# Patient Record
Sex: OTHER/UNKNOWN | URBAN - METROPOLITAN AREA
[De-identification: names, ages, dates, MRNs, and addresses within clinical notes are randomized per-mention and may not be internally consistent; named-entity substitution may affect disease eponyms.]

---

## 2023-05-04 ENCOUNTER — HOSPITAL ENCOUNTER (EMERGENCY)
Age: 88
Discharge: HOME | End: 2023-05-05
Attending: STUDENT IN AN ORGANIZED HEALTH CARE EDUCATION/TRAINING PROGRAM

## 2023-05-04 RX ORDER — 0.9 % SODIUM CHLORIDE 0.9 %
1000 INTRAVENOUS SOLUTION INTRAVENOUS ONCE
Status: DISCONTINUED | OUTPATIENT
Start: 2023-05-04 | End: 2023-05-05 | Stop reason: HOSPADM

## 2023-05-05 NOTE — ED PROVIDER NOTES
[address] [facility],[address]  Emergency Department    DISPOSITION       No diagnosis found. ED Course      Complexity of Problems Addressed:  {Complexity:51128}    Data Reviewed and Analyzed:  Category 1:   {external source:72594}  I ordered each unique test.  I interpreted the results of each unique test.    {Historian (state who, why needed, what they said):44583}    Category 2:   ED EKG was independently interpreted in the absence of a cardiologist.  Rate: ***  EKG Interpretation: {EKG Interpretation:86743:::1}  ST Segments: {ST Segments:27366}    {test reviewed:57928}    Category 3: Discussion of management or test interpretation. See ED Course above    HPI   Cq hBumika Nice is a 80 y.o. adult with a history of chronic left lower extremity injury who presents to the ED via EMS with complaint of unresponsiveness. Patient is unable to provide an accurate history. Per EMS, they were called out to [facility] after patient was noted to be minimally responsive with difficulty breathing. They did not give any medications. When EMS went to investigate his left lower extremity wound they states he became aggressive and started running down the sidewalk. They eventually convinced him to come to the hospital.  Initially reported to EMS that he took [address]. He is not forthcoming with me about any substance abuse tonight. He is awakening to voice and oriented to person and place    History   No past medical history on file. No past surgical history on file. No family history on file. Not on File    Physical Exam   There were no vitals filed for this visit. Nursing note and vitals reviewed. Constitutional: Well developed, sleepy but arouses to voice  HEENT: Atraumatic, conjugate gaze, EOM intact; pupils 3 mm  Neck: Supple  Cardiovascular: No cyanosis, diaphoresis, or JVD appreciated. Normal S1/S2 with no murmurs noted. Tachycardic  Respiratory: Effort normal. No respiratory distress.  Lungs

## 2023-10-14 ENCOUNTER — HOSPITAL ENCOUNTER (EMERGENCY)
Age: 88
Discharge: HOME | End: 2023-10-14
Attending: EMERGENCY MEDICINE

## 2023-10-14 ENCOUNTER — APPOINTMENT (OUTPATIENT)
Dept: CT IMAGING | Age: 88
End: 2023-10-14

## 2023-10-14 VITALS
OXYGEN SATURATION: 93 % | DIASTOLIC BLOOD PRESSURE: 65 MMHG | SYSTOLIC BLOOD PRESSURE: 115 MMHG | TEMPERATURE: 98.9 F | HEART RATE: 86 BPM | RESPIRATION RATE: 16 BRPM

## 2023-10-14 DIAGNOSIS — F10.920 ACUTE ALCOHOLIC INTOXICATION WITHOUT COMPLICATION (HCC): Primary | ICD-10-CM

## 2023-10-14 LAB
ANION GAP SERPL CALC-SCNC: 9 MMOL/L (ref 2–11)
BUN SERPL-MCNC: 3 MG/DL (ref 8–23)
CALCIUM SERPL-MCNC: 8 MG/DL (ref 8.3–10.4)
CHLORIDE SERPL-SCNC: 114 MMOL/L (ref 101–110)
CO2 SERPL-SCNC: 25 MMOL/L (ref 21–32)
CREAT SERPL-MCNC: 0.6 MG/DL (ref 0.8–1.5)
ERYTHROCYTE [DISTWIDTH] IN BLOOD BY AUTOMATED COUNT: 13 % (ref 11.9–14.6)
ETHANOL SERPL-MCNC: 468 MG/DL (ref 0–0.08)
GLUCOSE SERPL-MCNC: 126 MG/DL (ref 65–100)
HCT VFR BLD AUTO: 42.9 % (ref 41.1–50.3)
HGB BLD-MCNC: 14.6 G/DL (ref 13.6–17.2)
MCH RBC QN AUTO: 33.6 PG (ref 26.1–32.9)
MCHC RBC AUTO-ENTMCNC: 34 G/DL (ref 31.4–35)
MCV RBC AUTO: 98.8 FL (ref 82–102)
NRBC # BLD: 0 K/UL (ref 0–0.2)
PLATELET # BLD AUTO: 142 K/UL (ref 150–450)
PMV BLD AUTO: 11.8 FL (ref 9.4–12.3)
POTASSIUM SERPL-SCNC: 3.6 MMOL/L (ref 3.5–5.1)
RBC # BLD AUTO: 4.34 M/UL (ref 4.23–5.6)
SODIUM SERPL-SCNC: 148 MMOL/L (ref 133–143)
WBC # BLD AUTO: 7.8 K/UL (ref 4.3–11.1)

## 2023-10-14 PROCEDURE — 80048 BASIC METABOLIC PNL TOTAL CA: CPT

## 2023-10-14 PROCEDURE — 6360000002 HC RX W HCPCS: Performed by: EMERGENCY MEDICINE

## 2023-10-14 PROCEDURE — 96372 THER/PROPH/DIAG INJ SC/IM: CPT

## 2023-10-14 PROCEDURE — 82077 ASSAY SPEC XCP UR&BREATH IA: CPT

## 2023-10-14 PROCEDURE — 99284 EMERGENCY DEPT VISIT MOD MDM: CPT

## 2023-10-14 PROCEDURE — 85027 COMPLETE CBC AUTOMATED: CPT

## 2023-10-14 RX ORDER — THIAMINE HYDROCHLORIDE 100 MG/ML
100 INJECTION, SOLUTION INTRAMUSCULAR; INTRAVENOUS ONCE
Status: COMPLETED | OUTPATIENT
Start: 2023-10-14 | End: 2023-10-14

## 2023-10-14 RX ORDER — 0.9 % SODIUM CHLORIDE 0.9 %
1000 INTRAVENOUS SOLUTION INTRAVENOUS ONCE
Status: DISCONTINUED | OUTPATIENT
Start: 2023-10-14 | End: 2023-10-14 | Stop reason: HOSPADM

## 2023-10-14 RX ADMIN — THIAMINE HYDROCHLORIDE 100 MG: 100 INJECTION, SOLUTION INTRAMUSCULAR; INTRAVENOUS at 16:15

## 2023-10-14 ASSESSMENT — ENCOUNTER SYMPTOMS
COUGH: 0
SHORTNESS OF BREATH: 0

## 2023-10-14 NOTE — ED NOTES
Pt arrives via [facility] coming from [employer] on [address] after being found outside sleeping in the grass due to ETOH intoxication.  [place] speaking      Raisa Eid RN  10/14/23 5099

## 2023-10-14 NOTE — DISCHARGE INSTRUCTIONS
Follow up with [facility], for substance abuse help. Brie Nguyen   [phone]   They have multiple resources to help you. Please call.  www.dax. org     Other local resources that are available are: The [address] [facility] for inpatient and outpatient substance abuse issues. [address] [place] [address] [phone]  Medication assisted treatment    [address] [facility]  [phone]     Suicide Hotline   9-081-ZNZSQMG     Narcotics Anonymous   www.na. org  Alcoholics Anonymous  www.aa.org